# Patient Record
Sex: MALE | Race: BLACK OR AFRICAN AMERICAN | NOT HISPANIC OR LATINO | URBAN - METROPOLITAN AREA
[De-identification: names, ages, dates, MRNs, and addresses within clinical notes are randomized per-mention and may not be internally consistent; named-entity substitution may affect disease eponyms.]

---

## 2024-01-24 ENCOUNTER — OFFICE VISIT (OUTPATIENT)
Dept: URBAN - METROPOLITAN AREA CLINIC 96 | Facility: CLINIC | Age: 39
End: 2024-01-24
Payer: COMMERCIAL

## 2024-01-24 VITALS
WEIGHT: 315 LBS | TEMPERATURE: 97.1 F | SYSTOLIC BLOOD PRESSURE: 180 MMHG | DIASTOLIC BLOOD PRESSURE: 115 MMHG | HEART RATE: 85 BPM | BODY MASS INDEX: 41.75 KG/M2 | HEIGHT: 73 IN

## 2024-01-24 DIAGNOSIS — E66.01 MORBID (SEVERE) OBESITY DUE TO EXCESS CALORIES: ICD-10-CM

## 2024-01-24 DIAGNOSIS — R14.0 BLOATING: ICD-10-CM

## 2024-01-24 DIAGNOSIS — R68.81 EARLY SATIETY: ICD-10-CM

## 2024-01-24 DIAGNOSIS — R11.2 NAUSEA AND VOMITING IN ADULT: ICD-10-CM

## 2024-01-24 DIAGNOSIS — K21.9 GASTROESOPHAGEAL REFLUX DISEASE, UNSPECIFIED WHETHER ESOPHAGITIS PRESENT: ICD-10-CM

## 2024-01-24 DIAGNOSIS — R10.13 EPIGASTRIC PAIN: ICD-10-CM

## 2024-01-24 PROBLEM — 83911000119104: Status: ACTIVE | Noted: 2024-01-24

## 2024-01-24 PROBLEM — 235595009: Status: ACTIVE | Noted: 2024-01-24

## 2024-01-24 PROBLEM — 408512008: Status: ACTIVE | Noted: 2024-01-24

## 2024-01-24 PROCEDURE — 99204 OFFICE O/P NEW MOD 45 MIN: CPT | Performed by: INTERNAL MEDICINE

## 2024-01-24 RX ORDER — AMLODIPINE BESYLATE 5 MG/1
TABLET ORAL
Qty: 30 TABLET | Status: ACTIVE | COMMUNITY

## 2024-01-24 RX ORDER — SEMAGLUTIDE 2.68 MG/ML
INJECTION, SOLUTION SUBCUTANEOUS
Qty: 3 MILLILITER | Status: ACTIVE | COMMUNITY

## 2024-01-24 RX ORDER — LISINOPRIL 2.5 MG/1
TABLET ORAL
Qty: 90 TABLET | Status: ACTIVE | COMMUNITY

## 2024-01-24 NOTE — HPI-TODAY'S VISIT:
Patient presents self referred for abdominal pain.   Patient reports around [place] caught COVID and after recovery has had abdominal pain, no prior such pain, located in the lower abdomen. Constant. No post prandial exacerbation. No dysphagia, no odynophagia. No f/c. Does report weight loss 12-15# over the past month. Does report heartburn, not taking meds, again new since COVID. Does report n/v, rarely. Yesterday emesis non bloody, hours after eating. Does early satiety. Reports daily formed stools with no blood.  New since COVID. On Ozempic since 2018, no side effects with such. Last dose increase 6 months ago. GERD issues since COVID. Not taking any meds for such.   Recently at [facility] ER last week. Normal labs, CT with IV contrast was done and was normal per patient.   No hx of gallbladder issues. No hx of PUD. Reports last HbA1C 8.6 last week. Improved from prior 13 value.   Does report regular exercise, daily walking. No prior EGD. No family history of GI CA.

## 2024-01-24 NOTE — PHYSICAL EXAM GASTROINTESTINAL
Abdomen ,obese, tender in the epigastric area, decreased BS , no masses palpable , normal bowel sounds , Liver and Spleen,  no hepatosplenomegaly , liver nontender

## 2024-02-23 ENCOUNTER — EGD (OUTPATIENT)
Dept: URBAN - METROPOLITAN AREA SURGERY CENTER 18 | Facility: SURGERY CENTER | Age: 39
End: 2024-02-23